# Patient Record
Sex: FEMALE | URBAN - METROPOLITAN AREA
[De-identification: names, ages, dates, MRNs, and addresses within clinical notes are randomized per-mention and may not be internally consistent; named-entity substitution may affect disease eponyms.]

---

## 2021-06-15 ENCOUNTER — APPOINTMENT (RX ONLY)
Dept: URBAN - METROPOLITAN AREA CLINIC 76 | Facility: CLINIC | Age: 36
Setting detail: DERMATOLOGY
End: 2021-06-15

## 2021-06-15 DIAGNOSIS — L91.0 HYPERTROPHIC SCAR: ICD-10-CM

## 2021-06-15 PROCEDURE — ? COUNSELING

## 2021-06-15 PROCEDURE — 11900 INJECT SKIN LESIONS </W 7: CPT

## 2021-06-15 PROCEDURE — ? INTRALESIONAL KENALOG

## 2021-06-15 PROCEDURE — ? EXCEL V LASER

## 2021-06-15 ASSESSMENT — LOCATION ZONE DERM: LOCATION ZONE: TRUNK

## 2021-06-15 ASSESSMENT — LOCATION SIMPLE DESCRIPTION DERM: LOCATION SIMPLE: ABDOMEN

## 2021-06-15 ASSESSMENT — LOCATION DETAILED DESCRIPTION DERM: LOCATION DETAILED: PERIUMBILICAL SKIN

## 2021-06-15 NOTE — PROCEDURE: INTRALESIONAL KENALOG
Administered By (Optional): Dr. Sanabria
Concentration Of Kenalog Solution Injected (Mg/Ml): 2.5
Consent: The risks of atrophy were reviewed with the patient.
Validate Note Data When Using Inventory: Yes
Include Z78.9 (Other Specified Conditions Influencing Health Status) As An Associated Diagnosis?: No
X Size Of Lesion In Cm (Optional): 0.5
Detail Level: Detailed
Total Volume (Ccs): 0.75
Medical Necessity Clause: This procedure was medically necessary because the hypertrophic scar that was treated was: 1 scar located on the abdomen
Kenalog Preparation: Kenalog
Size Of Lesion (Optional): 12.3

## 2021-06-15 NOTE — PROCEDURE: EXCEL V LASER
Procedural Text: All persons in the room were wearing goggles during the treatment. Cold gel applied to treatment areas. The treatment areas were treated as noted above.
Laser Type: KTP 532nm
Pulse Width In Msec: 10
Post-Procedure Text: Post-op instructions discussed with patient.  NO CHARGE FOR THIS LASER TREATMENT PER YADIEL
Detail Level: Zone
Treatment Number (Will Not Render If 0): 0
Spot Size: 6
Consent: Written consent obtained, risks reviewed including but not limited to crusting, scabbing, blistering, scarring, darker or lighter pigmentary change, and/or incomplete removal.
Price (Use Numbers Only, No Special Characters Or $): 0.00
Fluence In J: 12
External Cooling Fan Speed: 3

## 2021-07-13 ENCOUNTER — APPOINTMENT (RX ONLY)
Dept: URBAN - METROPOLITAN AREA CLINIC 76 | Facility: CLINIC | Age: 36
Setting detail: DERMATOLOGY
End: 2021-07-13

## 2021-07-13 DIAGNOSIS — L91.0 HYPERTROPHIC SCAR: ICD-10-CM

## 2021-07-13 PROCEDURE — ? COUNSELING

## 2021-07-13 PROCEDURE — ? EXCEL V LASER

## 2021-07-13 PROCEDURE — 11900 INJECT SKIN LESIONS </W 7: CPT

## 2021-07-13 PROCEDURE — ? INJECTION

## 2021-07-13 PROCEDURE — ? INTRALESIONAL KENALOG

## 2021-07-13 ASSESSMENT — LOCATION SIMPLE DESCRIPTION DERM: LOCATION SIMPLE: ABDOMEN

## 2021-07-13 ASSESSMENT — LOCATION ZONE DERM: LOCATION ZONE: TRUNK

## 2021-07-13 ASSESSMENT — LOCATION DETAILED DESCRIPTION DERM: LOCATION DETAILED: PERIUMBILICAL SKIN

## 2021-07-13 NOTE — PROCEDURE: INJECTION
Consent: The risks of the medication were reviewed with the patient.
Additional Comments: 2 hypertrophic scars on the midline upper abdomen
Units: mg
Treatment Number: 1
Hide Second Medication?: No
Dose Administered (Numbers Only - Mg, G, Mcg, Units, Cc): 0
Bill J-Code: yes
Administered By (Optional): Dr. Sanabria
Units: cc
Post-Care Instructions: I reviewed with the patient in detail post-care instructions. Patient understands to keep the injection sites clean and call the clinic if there is any redness, swelling or pain.
Procedure Information: Please note that the numeric value listed in the Medication (1) and associated J-code units and Medication (2) and associated J-code units variables are j-code amounts and do not represent either the concentration or the total amount of the medications injected.  I strongly recommend selecting no to the Render J-code information in note question. This will allow your note to be more clear. If you are billing j-codes with your injection codes you need to document the total amount of the medication injected. This amount should match the j-code units. For example, if you are injecting Triamcinolone 40mg as an intramuscular injection you would select 40 for the dose field and mg for the units. This would allow you to document  with 4 units (40mg = 10mg x 4). The total volume is not used to calculate j-codes only the amount of the medication administered.
Total Volume Injected In Cc (Will Not Affected Billing): 0.2
Detail Level: Detailed
Medication (1) And Associated J-Code Units: Celestone, 3mg
Route: IL

## 2021-07-13 NOTE — PROCEDURE: INTRALESIONAL KENALOG
Medical Necessity Clause: This procedure was medically necessary because the hypertrophic scar that was treated was: 1 scar located on the abdomen
Size Of Lesion (Optional): 12.3
Total Volume (Ccs): 0.24
Detail Level: Detailed
Administered By (Optional): Dr. Sanabria
Concentration Of Kenalog Solution Injected (Mg/Ml): 2.5
Include Z78.9 (Other Specified Conditions Influencing Health Status) As An Associated Diagnosis?: No
X Size Of Lesion In Cm (Optional): 0.5
Consent: The risks of atrophy were reviewed with the patient.
Validate Note Data When Using Inventory: Yes
Kenalog Preparation: Kenalog

## 2021-07-13 NOTE — PROCEDURE: EXCEL V LASER
Consent: Written consent obtained, risks reviewed including but not limited to crusting, scabbing, blistering, scarring, darker or lighter pigmentary change, and/or incomplete removal.
Detail Level: Zone
External Cooling Fan Speed: 3
Pulse Width In Msec: 10
Procedural Text: All persons in the room were wearing goggles during the treatment. Cold gel applied to treatment areas. The treatment areas were treated as noted above.
Fluence In J: 4
Laser Type: KTP 532nm
Treatment Number (Will Not Render If 0): 0
Price (Use Numbers Only, No Special Characters Or $): 0.00
Post-Procedure Text: Post-op instructions discussed with patient.  NO CHARGE FOR THIS LASER TREATMENT PER YADIEL-THE 1ST LASER TREATMENT DATE THE WRONG SETTINGS WERE PUT INTO THE CHART-TODAY IS THE CORRECT LASER SETTINGS
Spot Size: 12

## 2021-10-05 ENCOUNTER — APPOINTMENT (RX ONLY)
Dept: URBAN - METROPOLITAN AREA CLINIC 76 | Facility: CLINIC | Age: 36
Setting detail: DERMATOLOGY
End: 2021-10-05

## 2021-10-05 DIAGNOSIS — L91.0 HYPERTROPHIC SCAR: ICD-10-CM

## 2021-10-05 PROCEDURE — ? COUNSELING

## 2021-10-05 PROCEDURE — ? INTRALESIONAL KENALOG

## 2021-10-05 PROCEDURE — 11900 INJECT SKIN LESIONS </W 7: CPT

## 2021-10-05 PROCEDURE — ? EXCEL V LASER

## 2021-10-05 ASSESSMENT — LOCATION DETAILED DESCRIPTION DERM: LOCATION DETAILED: PERIUMBILICAL SKIN

## 2021-10-05 ASSESSMENT — LOCATION SIMPLE DESCRIPTION DERM: LOCATION SIMPLE: ABDOMEN

## 2021-10-05 ASSESSMENT — LOCATION ZONE DERM: LOCATION ZONE: TRUNK

## 2021-10-05 NOTE — PROCEDURE: EXCEL V LASER
Fluence In J: 4
Procedural Text: All persons in the room were wearing goggles during the treatment. Cold gel applied to treatment areas. The treatment areas were treated as noted above.
Laser Type: KTP 532nm
Detail Level: Zone
Pulse Width In Msec: 10
Price (Use Numbers Only, No Special Characters Or $): 0.00
Post-Procedure Text: Post-op instructions discussed with patient.
Spot Size: 12
Consent: Written consent obtained, risks reviewed including but not limited to crusting, scabbing, blistering, scarring, darker or lighter pigmentary change, and/or incomplete removal.
Treatment Number (Will Not Render If 0): 0
External Cooling Fan Speed: 3

## 2021-12-02 ENCOUNTER — APPOINTMENT (RX ONLY)
Dept: URBAN - METROPOLITAN AREA CLINIC 76 | Facility: CLINIC | Age: 36
Setting detail: DERMATOLOGY
End: 2021-12-02

## 2021-12-02 DIAGNOSIS — L91.0 HYPERTROPHIC SCAR: ICD-10-CM

## 2021-12-02 PROCEDURE — 11900 INJECT SKIN LESIONS </W 7: CPT

## 2021-12-02 PROCEDURE — ? EXCEL V LASER

## 2021-12-02 PROCEDURE — ? INTRALESIONAL KENALOG

## 2021-12-02 PROCEDURE — ? COUNSELING

## 2021-12-02 ASSESSMENT — LOCATION DETAILED DESCRIPTION DERM: LOCATION DETAILED: PERIUMBILICAL SKIN

## 2021-12-02 ASSESSMENT — LOCATION ZONE DERM: LOCATION ZONE: TRUNK

## 2021-12-02 ASSESSMENT — LOCATION SIMPLE DESCRIPTION DERM: LOCATION SIMPLE: ABDOMEN

## 2021-12-02 NOTE — PROCEDURE: INTRALESIONAL KENALOG
Size Of Lesion (Optional): 12.3
Concentration Of Kenalog Solution Injected (Mg/Ml): 1.25
Consent: The risks of atrophy were reviewed with the patient.\\n\\n**if patient develops atrophy after today's treatment, will switch to straight 5FU**
Total Volume (Ccs): 0.5
Validate Note Data When Using Inventory: Yes
Administered By (Optional): Dr. Sanabria
Detail Level: Detailed
Medical Necessity Clause: This procedure was medically necessary because the hypertrophic scar that was treated was: 1 scar located on the abdomen
Kenalog Preparation: Kenalog
Include Z78.9 (Other Specified Conditions Influencing Health Status) As An Associated Diagnosis?: No

## 2021-12-02 NOTE — PROCEDURE: EXCEL V LASER
External Cooling Fan Speed: 3
Treatment Number (Will Not Render If 0): 0
Laser Type: KTP 532nm
Procedural Text: All persons in the room were wearing goggles during the treatment. Cold gel applied to treatment areas. The treatment areas were treated as noted above.
Consent: Written consent obtained, risks reviewed including but not limited to crusting, scabbing, blistering, scarring, darker or lighter pigmentary change, and/or incomplete removal.
Fluence In J: 4
Price (Use Numbers Only, No Special Characters Or $): 0.00
Detail Level: Zone
Pulse Width In Msec: 10
Post-Procedure Text: Post-op instructions discussed with patient.
Spot Size: 12

## 2023-05-02 NOTE — PROCEDURE: INTRALESIONAL KENALOG
Kenalog Preparation: Kenalog
Include Z78.9 (Other Specified Conditions Influencing Health Status) As An Associated Diagnosis?: No
Total Volume (Ccs): 0.3
Validate Note Data When Using Inventory: Yes
Detail Level: Detailed
Administered By (Optional): Dr. Sanabria
Medical Necessity Clause: This procedure was medically necessary because the hypertrophic scar that was treated was: 1 scar located on the abdomen
Size Of Lesion (Optional): 12.3
Consent: The risks of atrophy were reviewed with the patient.\\n\\n**if patient develops atrophy after today's treatment, will switch to straight 5FU**
X Size Of Lesion In Cm (Optional): 0.5
Concentration Of Kenalog Solution Injected (Mg/Ml): 1.25
Azathioprine Pregnancy And Lactation Text: This medication is Pregnancy Category D and isn't considered safe during pregnancy. It is unknown if this medication is excreted in breast milk.